# Patient Record
Sex: MALE | Race: WHITE | URBAN - METROPOLITAN AREA
[De-identification: names, ages, dates, MRNs, and addresses within clinical notes are randomized per-mention and may not be internally consistent; named-entity substitution may affect disease eponyms.]

---

## 2020-01-10 ENCOUNTER — OFFICE VISIT - HEALTHEAST (OUTPATIENT)
Dept: FAMILY MEDICINE | Facility: CLINIC | Age: 25
End: 2020-01-10

## 2020-01-10 DIAGNOSIS — R05.9 COUGH: ICD-10-CM

## 2020-01-10 DIAGNOSIS — J10.1 INFLUENZA B: ICD-10-CM

## 2020-01-10 LAB
FLUAV AG SPEC QL IA: ABNORMAL
FLUBV AG SPEC QL IA: ABNORMAL

## 2021-06-04 VITALS
DIASTOLIC BLOOD PRESSURE: 81 MMHG | OXYGEN SATURATION: 98 % | WEIGHT: 164 LBS | SYSTOLIC BLOOD PRESSURE: 145 MMHG | HEART RATE: 100 BPM | RESPIRATION RATE: 16 BRPM | TEMPERATURE: 98.4 F

## 2021-06-05 NOTE — PROGRESS NOTES
Impression:  Influenza B.  Is complaining of some numbness around his knees but he has normal reflexes and normal sensation in the feet and lower legs.  I discussed the case with Dr. Burkett from neurology and we both agree this is very low risk for a  neuropathy such as Guillian Barré.    Plan:  Tamiflu for 5 days, rest, fluids, Tylenol, if he notices worsening numbness or any weakness he should present immediately to the emergency department      Chief Complaint:  Chief Complaint   Patient presents with     Cough     tuesday - lungs hurt while coughing     Numbness     legs, knees and back - no injury wednesday     Fever         HPI:   Hunter Patterson is a 24 y.o. male who presents to this clinic for the evaluation of cough and numbness.  Patient developed an illness 4 days ago that started out as vomiting.  Following that he developed a sore throat cough and headache.  3 days ago he developed numbness in his lower legs that is especially pronounced around his knees.  He has had some intermittent leg weakness.  No incontinence.  He complains of tingling in his lower back.  No shortness of breath or chest pain.  The headache is moderate to severe and worse when he coughs.  He has no numbness or weakness in his arms or his hands or his face      PMH:   No past medical history on file.  No past surgical history on file.      ROS:  All other systems negative    Meds:  No current outpatient medications on file.        Social:  Social History     Socioeconomic History     Marital status: Single     Spouse name: Not on file     Number of children: Not on file     Years of education: Not on file     Highest education level: Not on file   Occupational History     Not on file   Social Needs     Financial resource strain: Not on file     Food insecurity:     Worry: Not on file     Inability: Not on file     Transportation needs:     Medical: Not on file     Non-medical: Not on file   Tobacco Use     Smoking status: Former  Smoker     Smokeless tobacco: Never Used   Substance and Sexual Activity     Alcohol use: Not on file     Drug use: Not on file     Sexual activity: Not on file   Lifestyle     Physical activity:     Days per week: Not on file     Minutes per session: Not on file     Stress: Not on file   Relationships     Social connections:     Talks on phone: Not on file     Gets together: Not on file     Attends Zoroastrianism service: Not on file     Active member of club or organization: Not on file     Attends meetings of clubs or organizations: Not on file     Relationship status: Not on file     Intimate partner violence:     Fear of current or ex partner: Not on file     Emotionally abused: Not on file     Physically abused: Not on file     Forced sexual activity: Not on file   Other Topics Concern     Not on file   Social History Narrative     Not on file         Physical Exam:  Sitting up in a chair no distress  Vital signs reviewed  Eyes: PERRL, EOMI  Head: Atraumatic and normocephalic  Pharynx: Erythema no exudate airway patent  Neck: No mass or tenderness  Lungs: Clear without distress  CV: Regular without murmur  Extremities: No tenderness or deformity  Skin: No lesions or rash  Neuro: Motor strength is normal throughout all extremities however there is decrease sensation to light touch around the knees in both legs, however sensation is normal below the knees in the lower leg and down into the foot.  Deep tendon reflexes are +2/4 and equal in the patellar and ankle jerks bilaterally  Psych: Awake, alert, normally responsive      Results:    Recent Results (from the past 24 hour(s))   Influenza A/B Rapid Test- Nasal Swab   Result Value Ref Range    Influenza  A, Rapid Antigen No Influenza A antigen detected No Influenza A antigen detected    Influenza B, Rapid Antigen Influenza B antigen detected (!) No Influenza B antigen detected       No results found.      Will Raines MD

## 2021-06-17 NOTE — PATIENT INSTRUCTIONS - HE
Patient Instructions by Will Raines MD at 1/10/2020 12:50 PM     Author: Will Raines MD Service: -- Author Type: Physician    Filed: 1/10/2020  1:33 PM Encounter Date: 1/10/2020 Status: Signed    : Will Raines MD (Physician)         Patient Education     Influenza (Adult)    Influenza is also called the flu. It is a viral illness that affects the air passages of your lungs. It is different from the common cold. The flu can easily be passed from one to person to another. It may be spread through the air by coughing and sneezing. Or it can be spread by touching the sick person and then touching your own eyes, nose, or mouth.  The flu starts 1 to 3 days after you are exposed to the flu virus. It may last for 1 to 2 weeks but many people feel tired or fatigued for many weeks afterward. You usually dont need to take antibiotics unless you have a complication. This might be an ear or sinus infection or pneumonia.  Symptoms of the flu may be mild or severe. They can include extreme tiredness (wanting to stay in bed all day), chills, fevers, muscle aches, soreness with eye movement, headache, and a dry, hacking cough.  Home care  Follow these guidelines when caring for yourself at home:    Avoid being around cigarette smoke, whether yours or other peoples.    Acetaminophen or ibuprofen will help ease your fever, muscle aches, and headache. Dont give aspirin to anyone younger than 18 who has the flu. Aspirin can harm the liver.    Nausea and loss of appetite are common with the flu. Eat light meals. Drink 6 to 8 glasses of liquids every day. Good choices are water, sport drinks, soft drinks without caffeine, juices, tea, and soup. Extra fluids will also help loosen secretions in your nose and lungs.    Over-the-counter cold medicines will not make the flu go away faster. But the medicines may help with coughing, sore throat, and congestion in your nose and sinuses. Dont use a decongestant if you  have high blood pressure.    Stay home until your fever has been gone for at least 24 hours without using medicine to reduce fever.  Follow-up care  Follow up with your healthcare provider, or as advised, if you are not getting better over the next week.  If you are age 65 or older, talk with your provider about getting a pneumococcal vaccine every 5 years. You should also get this vaccine if you have chronic asthma or COPD. All adults should get a flu vaccine every fall. Ask your provider about this.  When to seek medical advice  Call your healthcare provider right away if any of these occur:    Cough with lots of colored mucus (sputum) or blood in your mucus    Chest pain, shortness of breath, wheezing, or trouble breathing    Severe headache, or face, neck, or ear pain    New rash with fever    Fever of 100.4 F (38 C) or higher, or as directed by your healthcare provider    Confusion, behavior change, or seizure    Severe weakness or dizziness    You get a new fever or cough after getting better for a few days  Date Last Reviewed: 1/1/2017 2000-2017 [employer] [address] [place] [id number]. All rights reserved. This information is not intended as a substitute for professional medical care. Always follow your healthcare professional's instructions.

## 2021-06-20 NOTE — LETTER
Letter by Will Raines MD at      Author: Will Raines MD Service: -- Author Type: --    Filed:  Encounter Date: 1/10/2020 Status: Signed         January 10, 2020     Patient: Hunter Patterson   YOB: 1995   Date of Visit: 1/10/2020       To Whom it May Concern:    Hunter Patterson was seen in my clinic on 1/10/2020. He should be excused from work due to illness      Sincerely,         Electronically signed by Will Raines MD